# Patient Record
Sex: MALE | Race: WHITE | NOT HISPANIC OR LATINO | Employment: FULL TIME | URBAN - METROPOLITAN AREA
[De-identification: names, ages, dates, MRNs, and addresses within clinical notes are randomized per-mention and may not be internally consistent; named-entity substitution may affect disease eponyms.]

---

## 2019-06-23 ENCOUNTER — HOSPITAL ENCOUNTER (EMERGENCY)
Facility: MEDICAL CENTER | Age: 24
End: 2019-06-23
Attending: EMERGENCY MEDICINE
Payer: COMMERCIAL

## 2019-06-23 ENCOUNTER — APPOINTMENT (OUTPATIENT)
Dept: RADIOLOGY | Facility: MEDICAL CENTER | Age: 24
End: 2019-06-23
Attending: EMERGENCY MEDICINE
Payer: COMMERCIAL

## 2019-06-23 VITALS
OXYGEN SATURATION: 96 % | SYSTOLIC BLOOD PRESSURE: 113 MMHG | DIASTOLIC BLOOD PRESSURE: 60 MMHG | WEIGHT: 130 LBS | BODY MASS INDEX: 20.4 KG/M2 | HEIGHT: 67 IN | TEMPERATURE: 99.3 F | RESPIRATION RATE: 16 BRPM | HEART RATE: 59 BPM

## 2019-06-23 DIAGNOSIS — S93.402A SPRAIN OF LEFT ANKLE, UNSPECIFIED LIGAMENT, INITIAL ENCOUNTER: ICD-10-CM

## 2019-06-23 PROCEDURE — 99284 EMERGENCY DEPT VISIT MOD MDM: CPT

## 2019-06-23 PROCEDURE — 73610 X-RAY EXAM OF ANKLE: CPT | Mod: LT

## 2019-06-23 ASSESSMENT — ENCOUNTER SYMPTOMS: SENSORY CHANGE: 0

## 2019-06-23 NOTE — DISCHARGE INSTRUCTIONS
Keep your ankle in the splint.  Follow-up with orthopedics.  Return for pain swelling numbness, or other concerns.
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
## 2019-06-23 NOTE — ED NOTES
Pt discharged to home. Pt was given follow up instructions. Pt verbalized understanding of all instructions for discharge and is ambulatory out of ED on crutches with steady gait noted, friends driving pt home.
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
## 2020-09-12 ENCOUNTER — APPOINTMENT (OUTPATIENT)
Dept: RADIOLOGY | Facility: MEDICAL CENTER | Age: 25
DRG: 493 | End: 2020-09-12
Attending: ORTHOPAEDIC SURGERY
Payer: COMMERCIAL

## 2020-09-12 ENCOUNTER — ANESTHESIA EVENT (OUTPATIENT)
Dept: SURGERY | Facility: MEDICAL CENTER | Age: 25
DRG: 493 | End: 2020-09-12
Payer: COMMERCIAL

## 2020-09-12 ENCOUNTER — APPOINTMENT (OUTPATIENT)
Dept: RADIOLOGY | Facility: MEDICAL CENTER | Age: 25
DRG: 493 | End: 2020-09-12
Attending: EMERGENCY MEDICINE
Payer: COMMERCIAL

## 2020-09-12 ENCOUNTER — ANESTHESIA (OUTPATIENT)
Dept: SURGERY | Facility: MEDICAL CENTER | Age: 25
DRG: 493 | End: 2020-09-12
Payer: COMMERCIAL

## 2020-09-12 ENCOUNTER — HOSPITAL ENCOUNTER (INPATIENT)
Facility: MEDICAL CENTER | Age: 25
LOS: 1 days | DRG: 493 | End: 2020-09-13
Attending: EMERGENCY MEDICINE | Admitting: SURGERY
Payer: COMMERCIAL

## 2020-09-12 DIAGNOSIS — L08.9 INFECTED ABRASION OF RIGHT WRIST, INITIAL ENCOUNTER: ICD-10-CM

## 2020-09-12 DIAGNOSIS — S09.90XA CLOSED HEAD INJURY, INITIAL ENCOUNTER: ICD-10-CM

## 2020-09-12 DIAGNOSIS — S00.512A: ICD-10-CM

## 2020-09-12 DIAGNOSIS — S72.491A OTHER CLOSED FRACTURE OF DISTAL END OF RIGHT FEMUR, INITIAL ENCOUNTER (HCC): ICD-10-CM

## 2020-09-12 DIAGNOSIS — S72.401A CLOSED FRACTURE OF DISTAL END OF RIGHT FEMUR, INITIAL ENCOUNTER (HCC): ICD-10-CM

## 2020-09-12 DIAGNOSIS — S82.101A CLOSED FRACTURE OF PROXIMAL END OF RIGHT TIBIA, INITIAL ENCOUNTER: ICD-10-CM

## 2020-09-12 DIAGNOSIS — S02.401A CLOSED FRACTURE OF MAXILLARY SINUS, INITIAL ENCOUNTER (HCC): ICD-10-CM

## 2020-09-12 DIAGNOSIS — S60.811A INFECTED ABRASION OF RIGHT WRIST, INITIAL ENCOUNTER: ICD-10-CM

## 2020-09-12 PROBLEM — S72.402A CLOSED FRACTURE OF LEFT DISTAL FEMUR (HCC): Status: ACTIVE | Noted: 2020-09-12

## 2020-09-12 PROBLEM — Z90.5 ABSENCE OF KIDNEY: Status: ACTIVE | Noted: 2020-09-12

## 2020-09-12 PROBLEM — Z53.09 CONTRAINDICATION TO DEEP VEIN THROMBOSIS (DVT) PROPHYLAXIS: Status: ACTIVE | Noted: 2020-09-12

## 2020-09-12 PROBLEM — S06.0X9A CONCUSSION WITH LOSS OF CONSCIOUSNESS: Status: ACTIVE | Noted: 2020-09-12

## 2020-09-12 PROBLEM — Z11.9 SCREENING EXAMINATION FOR INFECTIOUS DISEASE: Status: ACTIVE | Noted: 2020-09-12

## 2020-09-12 PROBLEM — S02.40CA CLOSED FRACTURE OF RIGHT SIDE OF MAXILLA (HCC): Status: ACTIVE | Noted: 2020-09-12

## 2020-09-12 PROBLEM — S82.102A CLOSED FRACTURE OF LEFT PROXIMAL TIBIA: Status: ACTIVE | Noted: 2020-09-12

## 2020-09-12 LAB
ABO GROUP BLD: NORMAL
ALBUMIN SERPL BCP-MCNC: 4.7 G/DL (ref 3.2–4.9)
ALBUMIN/GLOB SERPL: 1.8 G/DL
ALP SERPL-CCNC: 71 U/L (ref 30–99)
ALT SERPL-CCNC: 90 U/L (ref 2–50)
ANION GAP SERPL CALC-SCNC: 14 MMOL/L (ref 7–16)
APTT PPP: 23.7 SEC (ref 24.7–36)
AST SERPL-CCNC: 76 U/L (ref 12–45)
BILIRUB SERPL-MCNC: 0.8 MG/DL (ref 0.1–1.5)
BLD GP AB SCN SERPL QL: NORMAL
BUN SERPL-MCNC: 11 MG/DL (ref 8–22)
CALCIUM SERPL-MCNC: 9.3 MG/DL (ref 8.5–10.5)
CHLORIDE SERPL-SCNC: 104 MMOL/L (ref 96–112)
CO2 SERPL-SCNC: 21 MMOL/L (ref 20–33)
COVID ORDER STATUS COVID19: NORMAL
CREAT SERPL-MCNC: 1 MG/DL (ref 0.5–1.4)
ERYTHROCYTE [DISTWIDTH] IN BLOOD BY AUTOMATED COUNT: 39.7 FL (ref 35.9–50)
ETHANOL BLD-MCNC: <10.1 MG/DL (ref 0–10.1)
GLOBULIN SER CALC-MCNC: 2.6 G/DL (ref 1.9–3.5)
GLUCOSE SERPL-MCNC: 154 MG/DL (ref 65–99)
HCT VFR BLD AUTO: 48.4 % (ref 42–52)
HGB BLD-MCNC: 16.9 G/DL (ref 14–18)
INR PPP: 0.98 (ref 0.87–1.13)
MCH RBC QN AUTO: 31.2 PG (ref 27–33)
MCHC RBC AUTO-ENTMCNC: 34.9 G/DL (ref 33.7–35.3)
MCV RBC AUTO: 89.5 FL (ref 81.4–97.8)
PLATELET # BLD AUTO: 300 K/UL (ref 164–446)
PMV BLD AUTO: 11.3 FL (ref 9–12.9)
POTASSIUM SERPL-SCNC: 3.4 MMOL/L (ref 3.6–5.5)
PROT SERPL-MCNC: 7.3 G/DL (ref 6–8.2)
PROTHROMBIN TIME: 13.3 SEC (ref 12–14.6)
RBC # BLD AUTO: 5.41 M/UL (ref 4.7–6.1)
RH BLD: NORMAL
SARS-COV-2 RDRP RESP QL NAA+PROBE: NOTDETECTED
SODIUM SERPL-SCNC: 139 MMOL/L (ref 135–145)
SPECIMEN SOURCE: NORMAL
WBC # BLD AUTO: 10.3 K/UL (ref 4.8–10.8)

## 2020-09-12 PROCEDURE — 700117 HCHG RX CONTRAST REV CODE 255: Performed by: EMERGENCY MEDICINE

## 2020-09-12 PROCEDURE — 160041 HCHG SURGERY MINUTES - EA ADDL 1 MIN LEVEL 4: Performed by: ORTHOPAEDIC SURGERY

## 2020-09-12 PROCEDURE — 85730 THROMBOPLASTIN TIME PARTIAL: CPT

## 2020-09-12 PROCEDURE — A6454 SELF-ADHER BAND W>=3" <5"/YD: HCPCS | Performed by: ORTHOPAEDIC SURGERY

## 2020-09-12 PROCEDURE — 700111 HCHG RX REV CODE 636 W/ 250 OVERRIDE (IP): Performed by: EMERGENCY MEDICINE

## 2020-09-12 PROCEDURE — 73552 X-RAY EXAM OF FEMUR 2/>: CPT | Mod: RT

## 2020-09-12 PROCEDURE — 74177 CT ABD & PELVIS W/CONTRAST: CPT

## 2020-09-12 PROCEDURE — 0QSG06Z REPOSITION RIGHT TIBIA WITH INTRAMEDULLARY INTERNAL FIXATION DEVICE, OPEN APPROACH: ICD-10-PCS | Performed by: ORTHOPAEDIC SURGERY

## 2020-09-12 PROCEDURE — 160029 HCHG SURGERY MINUTES - 1ST 30 MINS LEVEL 4: Performed by: ORTHOPAEDIC SURGERY

## 2020-09-12 PROCEDURE — 160048 HCHG OR STATISTICAL LEVEL 1-5: Performed by: ORTHOPAEDIC SURGERY

## 2020-09-12 PROCEDURE — 700101 HCHG RX REV CODE 250: Performed by: NURSE PRACTITIONER

## 2020-09-12 PROCEDURE — 80307 DRUG TEST PRSMV CHEM ANLYZR: CPT

## 2020-09-12 PROCEDURE — 90715 TDAP VACCINE 7 YRS/> IM: CPT | Performed by: EMERGENCY MEDICINE

## 2020-09-12 PROCEDURE — 160036 HCHG PACU - EA ADDL 30 MINS PHASE I: Performed by: ORTHOPAEDIC SURGERY

## 2020-09-12 PROCEDURE — 770006 HCHG ROOM/CARE - MED/SURG/GYN SEMI*

## 2020-09-12 PROCEDURE — U0004 COV-19 TEST NON-CDC HGH THRU: HCPCS

## 2020-09-12 PROCEDURE — 72170 X-RAY EXAM OF PELVIS: CPT

## 2020-09-12 PROCEDURE — 70486 CT MAXILLOFACIAL W/O DYE: CPT

## 2020-09-12 PROCEDURE — 96365 THER/PROPH/DIAG IV INF INIT: CPT

## 2020-09-12 PROCEDURE — 36415 COLL VENOUS BLD VENIPUNCTURE: CPT

## 2020-09-12 PROCEDURE — 72128 CT CHEST SPINE W/O DYE: CPT

## 2020-09-12 PROCEDURE — 86900 BLOOD TYPING SEROLOGIC ABO: CPT

## 2020-09-12 PROCEDURE — 73700 CT LOWER EXTREMITY W/O DYE: CPT | Mod: RT

## 2020-09-12 PROCEDURE — 502240 HCHG MISC OR SUPPLY RC 0272: Performed by: ORTHOPAEDIC SURGERY

## 2020-09-12 PROCEDURE — 700111 HCHG RX REV CODE 636 W/ 250 OVERRIDE (IP): Performed by: ANESTHESIOLOGY

## 2020-09-12 PROCEDURE — 160002 HCHG RECOVERY MINUTES (STAT): Performed by: ORTHOPAEDIC SURGERY

## 2020-09-12 PROCEDURE — 500891 HCHG PACK, ORTHO MAJOR: Performed by: ORTHOPAEDIC SURGERY

## 2020-09-12 PROCEDURE — 700101 HCHG RX REV CODE 250: Performed by: ANESTHESIOLOGY

## 2020-09-12 PROCEDURE — 72131 CT LUMBAR SPINE W/O DYE: CPT

## 2020-09-12 PROCEDURE — 700111 HCHG RX REV CODE 636 W/ 250 OVERRIDE (IP)

## 2020-09-12 PROCEDURE — 86901 BLOOD TYPING SEROLOGIC RH(D): CPT

## 2020-09-12 PROCEDURE — 501445 HCHG STAPLER, SKIN DISP: Performed by: ORTHOPAEDIC SURGERY

## 2020-09-12 PROCEDURE — G0390 TRAUMA RESPONS W/HOSP CRITI: HCPCS

## 2020-09-12 PROCEDURE — 502000 HCHG MISC OR IMPLANTS RC 0278: Performed by: ORTHOPAEDIC SURGERY

## 2020-09-12 PROCEDURE — 72125 CT NECK SPINE W/O DYE: CPT

## 2020-09-12 PROCEDURE — 71045 X-RAY EXAM CHEST 1 VIEW: CPT

## 2020-09-12 PROCEDURE — 80053 COMPREHEN METABOLIC PANEL: CPT

## 2020-09-12 PROCEDURE — 99291 CRITICAL CARE FIRST HOUR: CPT

## 2020-09-12 PROCEDURE — 70450 CT HEAD/BRAIN W/O DYE: CPT

## 2020-09-12 PROCEDURE — 85610 PROTHROMBIN TIME: CPT

## 2020-09-12 PROCEDURE — 160035 HCHG PACU - 1ST 60 MINS PHASE I: Performed by: ORTHOPAEDIC SURGERY

## 2020-09-12 PROCEDURE — 700105 HCHG RX REV CODE 258

## 2020-09-12 PROCEDURE — C1713 ANCHOR/SCREW BN/BN,TIS/BN: HCPCS | Performed by: ORTHOPAEDIC SURGERY

## 2020-09-12 PROCEDURE — 501838 HCHG SUTURE GENERAL: Performed by: ORTHOPAEDIC SURGERY

## 2020-09-12 PROCEDURE — C9803 HOPD COVID-19 SPEC COLLECT: HCPCS | Performed by: EMERGENCY MEDICINE

## 2020-09-12 PROCEDURE — 160009 HCHG ANES TIME/MIN: Performed by: ORTHOPAEDIC SURGERY

## 2020-09-12 PROCEDURE — 86850 RBC ANTIBODY SCREEN: CPT

## 2020-09-12 PROCEDURE — 500054 HCHG BANDAGE, ELASTIC 6: Performed by: ORTHOPAEDIC SURGERY

## 2020-09-12 PROCEDURE — A9270 NON-COVERED ITEM OR SERVICE: HCPCS | Performed by: NURSE PRACTITIONER

## 2020-09-12 PROCEDURE — 90471 IMMUNIZATION ADMIN: CPT

## 2020-09-12 PROCEDURE — 700102 HCHG RX REV CODE 250 W/ 637 OVERRIDE(OP): Performed by: NURSE PRACTITIONER

## 2020-09-12 PROCEDURE — 700105 HCHG RX REV CODE 258: Performed by: ANESTHESIOLOGY

## 2020-09-12 PROCEDURE — 3E0234Z INTRODUCTION OF SERUM, TOXOID AND VACCINE INTO MUSCLE, PERCUTANEOUS APPROACH: ICD-10-PCS | Performed by: SURGERY

## 2020-09-12 PROCEDURE — 96376 TX/PRO/DX INJ SAME DRUG ADON: CPT

## 2020-09-12 PROCEDURE — 85027 COMPLETE CBC AUTOMATED: CPT

## 2020-09-12 PROCEDURE — 700111 HCHG RX REV CODE 636 W/ 250 OVERRIDE (IP): Performed by: ORTHOPAEDIC SURGERY

## 2020-09-12 PROCEDURE — 96375 TX/PRO/DX INJ NEW DRUG ADDON: CPT

## 2020-09-12 PROCEDURE — 73590 X-RAY EXAM OF LOWER LEG: CPT | Mod: RT

## 2020-09-12 DEVICE — IMPLANTABLE DEVICE: Type: IMPLANTABLE DEVICE | Site: FEMUR | Status: FUNCTIONAL

## 2020-09-12 DEVICE — SCREW FOR IM NAILS TI LOCKING WITH T25 STARDRIVE 5.0MM 34MM (2TX2=4): Type: IMPLANTABLE DEVICE | Site: FEMUR | Status: FUNCTIONAL

## 2020-09-12 RX ORDER — ONDANSETRON 2 MG/ML
INJECTION INTRAMUSCULAR; INTRAVENOUS PRN
Status: DISCONTINUED | OUTPATIENT
Start: 2020-09-12 | End: 2020-09-12 | Stop reason: SURG

## 2020-09-12 RX ORDER — ACETAMINOPHEN 500 MG
1000 TABLET ORAL EVERY 6 HOURS
Status: DISCONTINUED | OUTPATIENT
Start: 2020-09-12 | End: 2020-09-13 | Stop reason: HOSPADM

## 2020-09-12 RX ORDER — ONDANSETRON 2 MG/ML
4 INJECTION INTRAMUSCULAR; INTRAVENOUS EVERY 4 HOURS PRN
Status: DISCONTINUED | OUTPATIENT
Start: 2020-09-12 | End: 2020-09-13 | Stop reason: HOSPADM

## 2020-09-12 RX ORDER — CEFAZOLIN SODIUM 1 G/3ML
INJECTION, POWDER, FOR SOLUTION INTRAMUSCULAR; INTRAVENOUS PRN
Status: DISCONTINUED | OUTPATIENT
Start: 2020-09-12 | End: 2020-09-12 | Stop reason: SURG

## 2020-09-12 RX ORDER — DIPHENHYDRAMINE HYDROCHLORIDE 50 MG/ML
12.5 INJECTION INTRAMUSCULAR; INTRAVENOUS
Status: DISCONTINUED | OUTPATIENT
Start: 2020-09-12 | End: 2020-09-12 | Stop reason: HOSPADM

## 2020-09-12 RX ORDER — AMOXICILLIN 250 MG
1 CAPSULE ORAL
Status: DISCONTINUED | OUTPATIENT
Start: 2020-09-12 | End: 2020-09-13 | Stop reason: HOSPADM

## 2020-09-12 RX ORDER — CEFAZOLIN SODIUM 2 G/100ML
INJECTION, SOLUTION INTRAVENOUS
Status: COMPLETED | OUTPATIENT
Start: 2020-09-12 | End: 2020-09-12

## 2020-09-12 RX ORDER — HYDROMORPHONE HYDROCHLORIDE 1 MG/ML
0.2 INJECTION, SOLUTION INTRAMUSCULAR; INTRAVENOUS; SUBCUTANEOUS
Status: DISCONTINUED | OUTPATIENT
Start: 2020-09-12 | End: 2020-09-12 | Stop reason: HOSPADM

## 2020-09-12 RX ORDER — MORPHINE SULFATE 4 MG/ML
4 INJECTION, SOLUTION INTRAMUSCULAR; INTRAVENOUS
Status: DISCONTINUED | OUTPATIENT
Start: 2020-09-12 | End: 2020-09-13 | Stop reason: HOSPADM

## 2020-09-12 RX ORDER — MIDAZOLAM HYDROCHLORIDE 1 MG/ML
1 INJECTION INTRAMUSCULAR; INTRAVENOUS
Status: DISCONTINUED | OUTPATIENT
Start: 2020-09-12 | End: 2020-09-12 | Stop reason: HOSPADM

## 2020-09-12 RX ORDER — DOCUSATE SODIUM 100 MG/1
100 CAPSULE, LIQUID FILLED ORAL 2 TIMES DAILY
Status: DISCONTINUED | OUTPATIENT
Start: 2020-09-12 | End: 2020-09-13 | Stop reason: HOSPADM

## 2020-09-12 RX ORDER — ONDANSETRON 2 MG/ML
4 INJECTION INTRAMUSCULAR; INTRAVENOUS ONCE
Status: COMPLETED | OUTPATIENT
Start: 2020-09-12 | End: 2020-09-12

## 2020-09-12 RX ORDER — CEFAZOLIN SODIUM 2 G/100ML
2 INJECTION, SOLUTION INTRAVENOUS EVERY 8 HOURS
Status: COMPLETED | OUTPATIENT
Start: 2020-09-12 | End: 2020-09-13

## 2020-09-12 RX ORDER — ONDANSETRON 2 MG/ML
4 INJECTION INTRAMUSCULAR; INTRAVENOUS
Status: DISCONTINUED | OUTPATIENT
Start: 2020-09-12 | End: 2020-09-12 | Stop reason: HOSPADM

## 2020-09-12 RX ORDER — OXYCODONE HCL 5 MG/5 ML
5 SOLUTION, ORAL ORAL
Status: DISCONTINUED | OUTPATIENT
Start: 2020-09-12 | End: 2020-09-12 | Stop reason: HOSPADM

## 2020-09-12 RX ORDER — AMOXICILLIN 250 MG
1 CAPSULE ORAL NIGHTLY
Status: DISCONTINUED | OUTPATIENT
Start: 2020-09-12 | End: 2020-09-13 | Stop reason: HOSPADM

## 2020-09-12 RX ORDER — SODIUM CHLORIDE, SODIUM LACTATE, POTASSIUM CHLORIDE, CALCIUM CHLORIDE 600; 310; 30; 20 MG/100ML; MG/100ML; MG/100ML; MG/100ML
INJECTION, SOLUTION INTRAVENOUS
Status: DISCONTINUED | OUTPATIENT
Start: 2020-09-12 | End: 2020-09-12 | Stop reason: SURG

## 2020-09-12 RX ORDER — SODIUM CHLORIDE 9 MG/ML
INJECTION, SOLUTION INTRAVENOUS
Status: COMPLETED
Start: 2020-09-12 | End: 2020-09-12

## 2020-09-12 RX ORDER — ROCURONIUM BROMIDE 10 MG/ML
INJECTION, SOLUTION INTRAVENOUS PRN
Status: DISCONTINUED | OUTPATIENT
Start: 2020-09-12 | End: 2020-09-12 | Stop reason: SURG

## 2020-09-12 RX ORDER — BACITRACIN ZINC AND POLYMYXIN B SULFATE 500; 1000 [USP'U]/G; [USP'U]/G
OINTMENT TOPICAL 3 TIMES DAILY
Status: DISCONTINUED | OUTPATIENT
Start: 2020-09-12 | End: 2020-09-13 | Stop reason: HOSPADM

## 2020-09-12 RX ORDER — POLYETHYLENE GLYCOL 3350 17 G/17G
1 POWDER, FOR SOLUTION ORAL 2 TIMES DAILY
Status: DISCONTINUED | OUTPATIENT
Start: 2020-09-12 | End: 2020-09-13 | Stop reason: HOSPADM

## 2020-09-12 RX ORDER — MEPERIDINE HYDROCHLORIDE 25 MG/ML
12.5 INJECTION INTRAMUSCULAR; INTRAVENOUS; SUBCUTANEOUS
Status: DISCONTINUED | OUTPATIENT
Start: 2020-09-12 | End: 2020-09-12 | Stop reason: HOSPADM

## 2020-09-12 RX ORDER — MORPHINE SULFATE 10 MG/ML
5 INJECTION, SOLUTION INTRAMUSCULAR; INTRAVENOUS ONCE
Status: DISCONTINUED | OUTPATIENT
Start: 2020-09-12 | End: 2020-09-13

## 2020-09-12 RX ORDER — ENEMA 19; 7 G/133ML; G/133ML
1 ENEMA RECTAL
Status: DISCONTINUED | OUTPATIENT
Start: 2020-09-12 | End: 2020-09-13 | Stop reason: HOSPADM

## 2020-09-12 RX ORDER — OXYCODONE HYDROCHLORIDE 5 MG/1
5 TABLET ORAL
Status: DISCONTINUED | OUTPATIENT
Start: 2020-09-12 | End: 2020-09-13 | Stop reason: HOSPADM

## 2020-09-12 RX ORDER — LIDOCAINE HYDROCHLORIDE 20 MG/ML
INJECTION, SOLUTION EPIDURAL; INFILTRATION; INTRACAUDAL; PERINEURAL PRN
Status: DISCONTINUED | OUTPATIENT
Start: 2020-09-12 | End: 2020-09-12 | Stop reason: SURG

## 2020-09-12 RX ORDER — DEXAMETHASONE SODIUM PHOSPHATE 4 MG/ML
INJECTION, SOLUTION INTRA-ARTICULAR; INTRALESIONAL; INTRAMUSCULAR; INTRAVENOUS; SOFT TISSUE PRN
Status: DISCONTINUED | OUTPATIENT
Start: 2020-09-12 | End: 2020-09-12 | Stop reason: SURG

## 2020-09-12 RX ORDER — MEPERIDINE HYDROCHLORIDE 25 MG/ML
INJECTION INTRAMUSCULAR; INTRAVENOUS; SUBCUTANEOUS
Status: COMPLETED
Start: 2020-09-12 | End: 2020-09-12

## 2020-09-12 RX ORDER — HYDROMORPHONE HYDROCHLORIDE 1 MG/ML
0.4 INJECTION, SOLUTION INTRAMUSCULAR; INTRAVENOUS; SUBCUTANEOUS
Status: DISCONTINUED | OUTPATIENT
Start: 2020-09-12 | End: 2020-09-12 | Stop reason: HOSPADM

## 2020-09-12 RX ORDER — OXYCODONE HCL 5 MG/5 ML
10 SOLUTION, ORAL ORAL
Status: DISCONTINUED | OUTPATIENT
Start: 2020-09-12 | End: 2020-09-12 | Stop reason: HOSPADM

## 2020-09-12 RX ORDER — OXYCODONE HYDROCHLORIDE 5 MG/1
10 TABLET ORAL
Status: DISCONTINUED | OUTPATIENT
Start: 2020-09-12 | End: 2020-09-13 | Stop reason: HOSPADM

## 2020-09-12 RX ORDER — MORPHINE SULFATE 4 MG/ML
INJECTION, SOLUTION INTRAMUSCULAR; INTRAVENOUS
Status: COMPLETED | OUTPATIENT
Start: 2020-09-12 | End: 2020-09-12

## 2020-09-12 RX ORDER — SODIUM CHLORIDE, SODIUM LACTATE, POTASSIUM CHLORIDE, CALCIUM CHLORIDE 600; 310; 30; 20 MG/100ML; MG/100ML; MG/100ML; MG/100ML
INJECTION, SOLUTION INTRAVENOUS CONTINUOUS
Status: DISCONTINUED | OUTPATIENT
Start: 2020-09-12 | End: 2020-09-12 | Stop reason: HOSPADM

## 2020-09-12 RX ORDER — HYDROMORPHONE HYDROCHLORIDE 1 MG/ML
0.1 INJECTION, SOLUTION INTRAMUSCULAR; INTRAVENOUS; SUBCUTANEOUS
Status: DISCONTINUED | OUTPATIENT
Start: 2020-09-12 | End: 2020-09-12 | Stop reason: HOSPADM

## 2020-09-12 RX ORDER — HALOPERIDOL 5 MG/ML
1 INJECTION INTRAMUSCULAR
Status: DISCONTINUED | OUTPATIENT
Start: 2020-09-12 | End: 2020-09-12 | Stop reason: HOSPADM

## 2020-09-12 RX ORDER — BISACODYL 10 MG
10 SUPPOSITORY, RECTAL RECTAL
Status: DISCONTINUED | OUTPATIENT
Start: 2020-09-12 | End: 2020-09-13 | Stop reason: HOSPADM

## 2020-09-12 RX ORDER — ONDANSETRON 2 MG/ML
INJECTION INTRAMUSCULAR; INTRAVENOUS
Status: COMPLETED | OUTPATIENT
Start: 2020-09-12 | End: 2020-09-12

## 2020-09-12 RX ADMIN — ACETAMINOPHEN 1000 MG: 500 TABLET ORAL at 23:54

## 2020-09-12 RX ADMIN — OXYCODONE 5 MG: 5 TABLET ORAL at 23:55

## 2020-09-12 RX ADMIN — ROCURONIUM BROMIDE 50 MG: 10 INJECTION, SOLUTION INTRAVENOUS at 15:03

## 2020-09-12 RX ADMIN — ONDANSETRON 4 MG: 2 INJECTION INTRAMUSCULAR; INTRAVENOUS at 16:17

## 2020-09-12 RX ADMIN — PROPOFOL 170 MG: 10 INJECTION, EMULSION INTRAVENOUS at 15:03

## 2020-09-12 RX ADMIN — MORPHINE SULFATE 4 MG: 4 INJECTION INTRAVENOUS at 11:11

## 2020-09-12 RX ADMIN — CEFAZOLIN 2 G: 330 INJECTION, POWDER, FOR SOLUTION INTRAMUSCULAR; INTRAVENOUS at 15:07

## 2020-09-12 RX ADMIN — MEPERIDINE HYDROCHLORIDE 12.5 MG: 25 INJECTION INTRAMUSCULAR; INTRAVENOUS; SUBCUTANEOUS at 16:50

## 2020-09-12 RX ADMIN — IOHEXOL 80 ML: 350 INJECTION, SOLUTION INTRAVENOUS at 11:54

## 2020-09-12 RX ADMIN — LIDOCAINE HYDROCHLORIDE 50 MG: 20 INJECTION, SOLUTION EPIDURAL; INFILTRATION; INTRACAUDAL at 15:03

## 2020-09-12 RX ADMIN — FENTANYL CITRATE 100 MCG: 50 INJECTION INTRAMUSCULAR; INTRAVENOUS at 15:00

## 2020-09-12 RX ADMIN — Medication 1 EACH: at 21:27

## 2020-09-12 RX ADMIN — CLOSTRIDIUM TETANI TOXOID ANTIGEN (FORMALDEHYDE INACTIVATED), CORYNEBACTERIUM DIPHTHERIAE TOXOID ANTIGEN (FORMALDEHYDE INACTIVATED), BORDETELLA PERTUSSIS TOXOID ANTIGEN (GLUTARALDEHYDE INACTIVATED), BORDETELLA PERTUSSIS FILAMENTOUS HEMAGGLUTININ ANTIGEN (FORMALDEHYDE INACTIVATED), BORDETELLA PERTUSSIS PERTACTIN ANTIGEN, AND BORDETELLA PERTUSSIS FIMBRIAE 2/3 ANTIGEN 0.5 ML: 5; 2; 2.5; 5; 3; 5 INJECTION, SUSPENSION INTRAMUSCULAR at 11:23

## 2020-09-12 RX ADMIN — SUGAMMADEX 200 MG: 100 INJECTION, SOLUTION INTRAVENOUS at 16:23

## 2020-09-12 RX ADMIN — SODIUM CHLORIDE, POTASSIUM CHLORIDE, SODIUM LACTATE AND CALCIUM CHLORIDE: 600; 310; 30; 20 INJECTION, SOLUTION INTRAVENOUS at 14:58

## 2020-09-12 RX ADMIN — OXYCODONE 5 MG: 5 TABLET ORAL at 18:57

## 2020-09-12 RX ADMIN — DOCUSATE SODIUM 100 MG: 100 CAPSULE ORAL at 18:48

## 2020-09-12 RX ADMIN — CEFAZOLIN SODIUM 2 G: 2 INJECTION, SOLUTION INTRAVENOUS at 22:15

## 2020-09-12 RX ADMIN — DEXAMETHASONE SODIUM PHOSPHATE 8 MG: 4 INJECTION, SOLUTION INTRA-ARTICULAR; INTRALESIONAL; INTRAMUSCULAR; INTRAVENOUS; SOFT TISSUE at 15:14

## 2020-09-12 RX ADMIN — SODIUM CHLORIDE 500 ML: 9 INJECTION, SOLUTION INTRAVENOUS at 23:57

## 2020-09-12 RX ADMIN — ONDANSETRON 4 MG: 2 INJECTION INTRAMUSCULAR; INTRAVENOUS at 11:11

## 2020-09-12 RX ADMIN — HYDROMORPHONE HYDROCHLORIDE 0.4 MG: 1 INJECTION, SOLUTION INTRAMUSCULAR; INTRAVENOUS; SUBCUTANEOUS at 17:13

## 2020-09-12 RX ADMIN — DOCUSATE SODIUM 50 MG AND SENNOSIDES 8.6 MG 1 TABLET: 8.6; 5 TABLET, FILM COATED ORAL at 21:27

## 2020-09-12 RX ADMIN — ONDANSETRON 4 MG: 2 INJECTION INTRAMUSCULAR; INTRAVENOUS at 12:08

## 2020-09-12 RX ADMIN — CEFAZOLIN SODIUM 2 G: 2 INJECTION, SOLUTION INTRAVENOUS at 11:16

## 2020-09-12 RX ADMIN — ACETAMINOPHEN 1000 MG: 500 TABLET ORAL at 18:47

## 2020-09-12 SDOH — HEALTH STABILITY: MENTAL HEALTH: HOW OFTEN DO YOU HAVE A DRINK CONTAINING ALCOHOL?: NEVER

## 2020-09-12 ASSESSMENT — LIFESTYLE VARIABLES
TOTAL SCORE: 0
HAVE PEOPLE ANNOYED YOU BY CRITICIZING YOUR DRINKING: NO
HAVE YOU EVER FELT YOU SHOULD CUT DOWN ON YOUR DRINKING: NO
AVERAGE NUMBER OF DAYS PER WEEK YOU HAVE A DRINK CONTAINING ALCOHOL: 2
EVER HAD A DRINK FIRST THING IN THE MORNING TO STEADY YOUR NERVES TO GET RID OF A HANGOVER: NO
ALCOHOL_USE: NO
EVER FELT BAD OR GUILTY ABOUT YOUR DRINKING: NO
TOTAL SCORE: 0
TOTAL SCORE: 0
CONSUMPTION TOTAL: NEGATIVE
HOW MANY TIMES IN THE PAST YEAR HAVE YOU HAD 5 OR MORE DRINKS IN A DAY: 0
ON A TYPICAL DAY WHEN YOU DRINK ALCOHOL HOW MANY DRINKS DO YOU HAVE: 3

## 2020-09-12 ASSESSMENT — PAIN DESCRIPTION - PAIN TYPE
TYPE: SURGICAL PAIN

## 2020-09-12 ASSESSMENT — COGNITIVE AND FUNCTIONAL STATUS - GENERAL
DAILY ACTIVITIY SCORE: 18
MOVING TO AND FROM BED TO CHAIR: A LOT
MOBILITY SCORE: 12
MOVING FROM LYING ON BACK TO SITTING ON SIDE OF FLAT BED: A LOT
WALKING IN HOSPITAL ROOM: A LOT
STANDING UP FROM CHAIR USING ARMS: A LOT
TOILETING: A LITTLE
DRESSING REGULAR LOWER BODY CLOTHING: A LITTLE
HELP NEEDED FOR BATHING: A LITTLE
EATING MEALS: A LITTLE
SUGGESTED CMS G CODE MODIFIER DAILY ACTIVITY: CK
PERSONAL GROOMING: A LITTLE
SUGGESTED CMS G CODE MODIFIER MOBILITY: CL
CLIMB 3 TO 5 STEPS WITH RAILING: A LOT
DRESSING REGULAR UPPER BODY CLOTHING: A LITTLE
TURNING FROM BACK TO SIDE WHILE IN FLAT BAD: A LOT

## 2020-09-12 ASSESSMENT — PATIENT HEALTH QUESTIONNAIRE - PHQ9
2. FEELING DOWN, DEPRESSED, IRRITABLE, OR HOPELESS: NOT AT ALL
SUM OF ALL RESPONSES TO PHQ9 QUESTIONS 1 AND 2: 0
1. LITTLE INTEREST OR PLEASURE IN DOING THINGS: NOT AT ALL

## 2020-09-12 ASSESSMENT — PAIN SCALES - GENERAL: PAIN_LEVEL: 4

## 2020-09-12 ASSESSMENT — FIBROSIS 4 INDEX: FIB4 SCORE: 0.64

## 2020-09-12 NOTE — ASSESSMENT & PLAN NOTE
Displaced angulated distal femoral diaphysis fracture.  9/13 ORIF  Weight bearing status - Touch toe weightbearing RLE.  Lele Corbett MD. Orthopedic Surgeon. [facility].
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
## 2020-09-12 NOTE — ANESTHESIA PREPROCEDURE EVALUATION
Relevant Problems   No relevant active problems       Physical Exam    Airway   Mallampati: II  TM distance: >3 FB  Neck ROM: full       Cardiovascular - normal exam  Rhythm: regular  Rate: normal  (-) murmur     Dental - normal exam           Pulmonary - normal exam  Breath sounds clear to auscultation     Abdominal    Neurological - normal exam                 Anesthesia Plan    ASA 2- EMERGENT   ASA physical status emergent criteria: displaced fracture with possible neurovascular compromise    Plan - general       Airway plan will be ETT                  Informed Consent:
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
## 2020-09-12 NOTE — PROGRESS NOTES
ORTHO RESPONSE TIME:  I was contacted by Waqas Iqbal PA-C at 11:14am requesting a formal orthopaedic consultation.  I responded to the consultation request at 11:24am.  I physically evaluated the patient at 11:28am in CT.    Lele Corbett M.D.

## 2020-09-12 NOTE — OR SURGEON
Immediate Post OP Note    PreOp Diagnosis: Right distal third femur shaft fracture    PostOp Diagnosis: same    Procedure(s):  INSERTION, INTRAMEDULLARY CRISTIAN, FEMUR - Wound Class: Clean    Surgeon(s):  Lele Corbett M.D.    Anesthesiologist/Type of Anesthesia:  Anesthesiologist: Dimas Zimmer M.D./General    Surgical Staff:  Circulator: Lukas D. Gansert, R.N.  Scrub Person: Anita A Reyes, R.N.; Jose Ramon Pan  First Assist: Waqas Iqbal P.A.-C.  Radiology Technologist: Luc Weiss; Sheila Reyes    Specimens removed if any:  * No specimens in log *    Estimated Blood Loss: minimal    Findings: see dictation    Complications: none known    PLAN:  --readmit trauma postop  --TTWB RLE with crutches  --ancef x 2 doses postop  --PT/OT for mobilization ASAP  --okay to start lovenox or equivalent in am, continue SCDs        9/12/2020 4:28 PM Lele Corbett M.D.

## 2020-09-12 NOTE — ED NOTES
Pt friend Sabino at bedside. Pt adimante that this is the only visitor he would like to be brought to bedside at this time. Explained current visitor policy to pt, pt verbalized understanding.

## 2020-09-12 NOTE — ASSESSMENT & PLAN NOTE
Likely minimally displaced fracture of the posterolateral wall of the right maxillary sinus. There is fluid within that sinus.  Non operative

## 2020-09-12 NOTE — ASSESSMENT & PLAN NOTE
Right tibial plateau fracture.  9/12 Closed treatment in OR  Weight bearing status - Touch toe weightbearing RLE.  Lele Corbett MD. Orthopedic Surgeon. [facility].
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
## 2020-09-12 NOTE — ANESTHESIA PROCEDURE NOTES
Airway    Date/Time: 9/12/2020 3:04 PM  Performed by: Dimas Zimmer M.D.  Authorized by: Dimas Zimmer M.D.     Location:  OR  Urgency:  Elective  Difficult Airway: No    Indications for Airway Management:  Anesthesia      Spontaneous Ventilation: absent    Sedation Level:  Deep  Preoxygenated: Yes    Patient Position:  Sniffing  Final Airway Type:  Endotracheal airway  Final Endotracheal Airway:  ETT  Cuffed: Yes    Technique Used for Successful ETT Placement:  Direct laryngoscopy    Insertion Site:  Oral  Blade Type:  Eleni  Laryngoscope Blade/Videolaryngoscope Blade Size:  3  ETT Size (mm):  8.0  Measured from:  Lips  ETT to Lips (cm):  22  Placement Verified by: auscultation and capnometry    Cormack-Lehane Classification:  Grade IIa - partial view of glottis  Number of Attempts at Approach:  1  Number of Other Approaches Attempted:  0

## 2020-09-13 VITALS
WEIGHT: 137.35 LBS | RESPIRATION RATE: 16 BRPM | DIASTOLIC BLOOD PRESSURE: 68 MMHG | SYSTOLIC BLOOD PRESSURE: 109 MMHG | HEART RATE: 63 BPM | OXYGEN SATURATION: 95 % | HEIGHT: 63 IN | TEMPERATURE: 99.1 F | BODY MASS INDEX: 24.34 KG/M2

## 2020-09-13 PROBLEM — S82.101A: Status: ACTIVE | Noted: 2020-09-12

## 2020-09-13 PROBLEM — Z11.9 SCREENING EXAMINATION FOR INFECTIOUS DISEASE: Status: RESOLVED | Noted: 2020-09-12 | Resolved: 2020-09-13

## 2020-09-13 PROBLEM — J45.909 ASTHMA: Status: ACTIVE | Noted: 2020-09-13

## 2020-09-13 PROBLEM — S72.401A CLOSED FRACTURE OF DISTAL END OF RIGHT FEMUR, INITIAL ENCOUNTER (HCC): Status: ACTIVE | Noted: 2020-09-12

## 2020-09-13 PROBLEM — T14.90XA TRAUMA: Status: ACTIVE | Noted: 2020-09-13

## 2020-09-13 LAB
ABO + RH BLD: NORMAL
ALBUMIN SERPL BCP-MCNC: 3.9 G/DL (ref 3.2–4.9)
ALBUMIN/GLOB SERPL: 1.8 G/DL
ALP SERPL-CCNC: 50 U/L (ref 30–99)
ALT SERPL-CCNC: 63 U/L (ref 2–50)
ANION GAP SERPL CALC-SCNC: 13 MMOL/L (ref 7–16)
AST SERPL-CCNC: 79 U/L (ref 12–45)
BASOPHILS # BLD AUTO: 0.1 % (ref 0–1.8)
BASOPHILS # BLD: 0.01 K/UL (ref 0–0.12)
BILIRUB SERPL-MCNC: 0.6 MG/DL (ref 0.1–1.5)
BUN SERPL-MCNC: 9 MG/DL (ref 8–22)
CALCIUM SERPL-MCNC: 8.9 MG/DL (ref 8.5–10.5)
CHLORIDE SERPL-SCNC: 102 MMOL/L (ref 96–112)
CO2 SERPL-SCNC: 24 MMOL/L (ref 20–33)
CREAT SERPL-MCNC: 0.89 MG/DL (ref 0.5–1.4)
EOSINOPHIL # BLD AUTO: 0 K/UL (ref 0–0.51)
EOSINOPHIL NFR BLD: 0 % (ref 0–6.9)
ERYTHROCYTE [DISTWIDTH] IN BLOOD BY AUTOMATED COUNT: 39.8 FL (ref 35.9–50)
GLOBULIN SER CALC-MCNC: 2.2 G/DL (ref 1.9–3.5)
GLUCOSE SERPL-MCNC: 139 MG/DL (ref 65–99)
HCT VFR BLD AUTO: 38.9 % (ref 42–52)
HGB BLD-MCNC: 13.3 G/DL (ref 14–18)
IMM GRANULOCYTES # BLD AUTO: 0.04 K/UL (ref 0–0.11)
IMM GRANULOCYTES NFR BLD AUTO: 0.4 % (ref 0–0.9)
LYMPHOCYTES # BLD AUTO: 1.05 K/UL (ref 1–4.8)
LYMPHOCYTES NFR BLD: 10.3 % (ref 22–41)
MCH RBC QN AUTO: 31.1 PG (ref 27–33)
MCHC RBC AUTO-ENTMCNC: 34.2 G/DL (ref 33.7–35.3)
MCV RBC AUTO: 90.9 FL (ref 81.4–97.8)
MONOCYTES # BLD AUTO: 1.05 K/UL (ref 0–0.85)
MONOCYTES NFR BLD AUTO: 10.3 % (ref 0–13.4)
NEUTROPHILS # BLD AUTO: 8 K/UL (ref 1.82–7.42)
NEUTROPHILS NFR BLD: 78.9 % (ref 44–72)
NRBC # BLD AUTO: 0 K/UL
NRBC BLD-RTO: 0 /100 WBC
PLATELET # BLD AUTO: 216 K/UL (ref 164–446)
PMV BLD AUTO: 11.3 FL (ref 9–12.9)
POTASSIUM SERPL-SCNC: 4.2 MMOL/L (ref 3.6–5.5)
PROT SERPL-MCNC: 6.1 G/DL (ref 6–8.2)
RBC # BLD AUTO: 4.28 M/UL (ref 4.7–6.1)
SODIUM SERPL-SCNC: 139 MMOL/L (ref 135–145)
WBC # BLD AUTO: 10.2 K/UL (ref 4.8–10.8)

## 2020-09-13 PROCEDURE — 700102 HCHG RX REV CODE 250 W/ 637 OVERRIDE(OP): Performed by: NURSE PRACTITIONER

## 2020-09-13 PROCEDURE — 80053 COMPREHEN METABOLIC PANEL: CPT

## 2020-09-13 PROCEDURE — 97166 OT EVAL MOD COMPLEX 45 MIN: CPT

## 2020-09-13 PROCEDURE — 85025 COMPLETE CBC W/AUTO DIFF WBC: CPT

## 2020-09-13 PROCEDURE — A9270 NON-COVERED ITEM OR SERVICE: HCPCS | Performed by: NURSE PRACTITIONER

## 2020-09-13 PROCEDURE — 97161 PT EVAL LOW COMPLEX 20 MIN: CPT

## 2020-09-13 PROCEDURE — 700111 HCHG RX REV CODE 636 W/ 250 OVERRIDE (IP): Performed by: NURSE PRACTITIONER

## 2020-09-13 PROCEDURE — 36415 COLL VENOUS BLD VENIPUNCTURE: CPT

## 2020-09-13 PROCEDURE — 700101 HCHG RX REV CODE 250: Performed by: NURSE PRACTITIONER

## 2020-09-13 PROCEDURE — 700111 HCHG RX REV CODE 636 W/ 250 OVERRIDE (IP): Performed by: ORTHOPAEDIC SURGERY

## 2020-09-13 RX ORDER — ACETAMINOPHEN 500 MG
1000 TABLET ORAL EVERY 6 HOURS
Qty: 30 TAB | Refills: 0 | COMMUNITY
Start: 2020-09-13 | End: 2022-01-21

## 2020-09-13 RX ORDER — OXYCODONE HYDROCHLORIDE 5 MG/1
5 TABLET ORAL EVERY 4 HOURS PRN
Qty: 30 TAB | Refills: 0 | Status: SHIPPED | OUTPATIENT
Start: 2020-09-13 | End: 2020-09-20

## 2020-09-13 RX ADMIN — OXYCODONE 10 MG: 5 TABLET ORAL at 12:26

## 2020-09-13 RX ADMIN — Medication 1 EACH: at 06:12

## 2020-09-13 RX ADMIN — ENOXAPARIN SODIUM 30 MG: 30 INJECTION SUBCUTANEOUS at 06:12

## 2020-09-13 RX ADMIN — MORPHINE SULFATE 4 MG: 4 INJECTION INTRAVENOUS at 10:51

## 2020-09-13 RX ADMIN — OXYCODONE 5 MG: 5 TABLET ORAL at 08:51

## 2020-09-13 RX ADMIN — CEFAZOLIN SODIUM 2 G: 2 INJECTION, SOLUTION INTRAVENOUS at 06:12

## 2020-09-13 RX ADMIN — ACETAMINOPHEN 1000 MG: 500 TABLET ORAL at 06:11

## 2020-09-13 RX ADMIN — OXYCODONE 10 MG: 5 TABLET ORAL at 15:34

## 2020-09-13 RX ADMIN — ACETAMINOPHEN 1000 MG: 500 TABLET ORAL at 11:21

## 2020-09-13 RX ADMIN — Medication 1 EACH: at 11:22

## 2020-09-13 ASSESSMENT — COGNITIVE AND FUNCTIONAL STATUS - GENERAL
TURNING FROM BACK TO SIDE WHILE IN FLAT BAD: A LOT
MOVING FROM LYING ON BACK TO SITTING ON SIDE OF FLAT BED: A LITTLE
MOVING TO AND FROM BED TO CHAIR: UNABLE
WALKING IN HOSPITAL ROOM: A LITTLE
TOILETING: A LITTLE
HELP NEEDED FOR BATHING: A LOT
DRESSING REGULAR LOWER BODY CLOTHING: A LOT
STANDING UP FROM CHAIR USING ARMS: A LITTLE
SUGGESTED CMS G CODE MODIFIER DAILY ACTIVITY: CK
CLIMB 3 TO 5 STEPS WITH RAILING: A LITTLE
DAILY ACTIVITIY SCORE: 19
SUGGESTED CMS G CODE MODIFIER MOBILITY: CK
MOBILITY SCORE: 15

## 2020-09-13 ASSESSMENT — ENCOUNTER SYMPTOMS: MYALGIAS: 1

## 2020-09-13 ASSESSMENT — PATIENT HEALTH QUESTIONNAIRE - PHQ9
2. FEELING DOWN, DEPRESSED, IRRITABLE, OR HOPELESS: NOT AT ALL
1. LITTLE INTEREST OR PLEASURE IN DOING THINGS: NOT AT ALL
SUM OF ALL RESPONSES TO PHQ9 QUESTIONS 1 AND 2: 0

## 2020-09-13 ASSESSMENT — PAIN DESCRIPTION - PAIN TYPE
TYPE: ACUTE PAIN
TYPE: SURGICAL PAIN
TYPE: ACUTE PAIN
TYPE: ACUTE PAIN

## 2020-09-13 ASSESSMENT — ACTIVITIES OF DAILY LIVING (ADL): TOILETING: INDEPENDENT

## 2020-09-13 ASSESSMENT — GAIT ASSESSMENTS
DISTANCE (FEET): 20
ASSISTIVE DEVICE: FRONT WHEEL WALKER
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
## 2020-09-13 NOTE — ANESTHESIA QCDR
2019 [facility] Clinical Data Registry (for Quality Improvement)     Postoperative nausea/vomiting risk protocol (Adult = 18 yrs and Pediatric 3-17 yrs)- (430 and 463)  General inhalation anesthetic (NOT TIVA) with PONV risk factors: Yes  Provision of anti-emetic therapy with at least 2 different classes of agents: Yes   Patient DID NOT receive anti-emetic therapy and reason is documented in Medical Record:  N/A    Multimodal Pain Management- (477)  Non-emergent surgery AND patient age >= 18: No  Use of Multimodal Pain Management, two or more drugs and/or interventions, NOT including systemic opioids:   Exception: Documented allergy to multiple classes of analgesics:     Smoking Abstinence (404)  Patient is current smoker (cigarette, pipe, e-cig, marijuanna): No  Elective Surgery:   Abstinence instructions provided prior to day of surgery:   Patient abstained from smoking on day of surgery:     Pre-Op Beta-Blocker in Isolated CABG (44)  Isolated CABG AND patient age >= 18: No  Beta-blocker admin within 24 hours of surgical incision:   Exception:of medical reason(s) for not administering beta blocker within 24 hours prior to surgical incision (e.g., not  indicated,other medical reason):     PACU assessment of acute postoperative pain prior to Anesthesia Care End- Applies to Patients Age = 18- (ABG7)  Initial PACU pain score is which of the following: < 7/10  Patient unable to report pain score: N/A    Post-anesthetic transfer of care checklist/protocol to PACU/ICU- (426 and 427)  Upon conclusion of case, patient transferred to which of the following locations: PACU/Non-ICU  Use of transfer checklist/protocol: Yes  Exclusion: Service Performed in Patient Hospital Room (and thus did not require transfer): N/A  Unplanned admission to ICU related to anesthesia service up through end of PACU care- (MD51)  Unplanned admission to ICU (not initially anticipated at anesthesia start time): No

## 2020-09-13 NOTE — DISCHARGE INSTRUCTIONS
Discharge Instructions    Discharged to home by car with relative. Discharged via wheelchair, hospital escort: Yes.  Special equipment needed: Walker    Be sure to schedule a follow-up appointment with your primary care doctor or any specialists as instructed.     Discharge Plan:        I understand that a diet low in cholesterol, fat, and sodium is recommended for good health. Unless I have been given specific instructions below for another diet, I accept this instruction as my diet prescription.   Other diet: regular    Special Instructions: Discharge instructions for the Orthopedic Patient    Follow up with Primary Care Physician within 2 weeks of discharge to home, regarding:  Review of medications and diagnostic testing.  Surveillance for medical complications.  Workup and treatment of osteoporosis, if appropriate.     -Is this a Hip/Knee/Shoulder Joint Replacement patient? No    -Is this patient being discharged with medication to prevent blood clots?  No    · Is patient discharged on Warfarin / Coumadin?   No     - Call or seek medical attention for questions or concerns  - Follow up with Dr. Corbett in 1-2 weeks time  - Toe Touch weightbearing  - Follow up with primary care provider within one weeks time  - Resume regular diet  - May take over the counter acetaminophen (tylenol) or ibuprofen as needed for pain  - Continue daily over the counter stool softener (docusate or senna)while on narcotics  - No operation of machinery or motorized vehicles while under the influence of narcotics  - No alcohol, marijuana or illicit drug use while under the influence of narcotics  - No swimming, hot tubs, baths or wound submersion until cleared by outpatient provider. May shower  - No contact sports, strenuous activities, or heavy lifting until cleared by outpatient provider  - If respiratory decompensation, change in condition or worsening condition, or signs or symptoms of infection occur seek medical  attention      Intramedullary Nailing of Femoral Shaft Fracture, Care After  This sheet gives you information about how to care for yourself after your procedure. Your health care provider may also give you more specific instructions. If you have problems or questions, contact your health care provider.  What can I expect after the procedure?  After the procedure, it is common to have these symptoms in the surgical area:  · Pain.  · Swelling.  · Tenderness.  · Bruising.  · Stiffness.  · Weakness.  Follow these instructions at home:  Medicines  · Take over-the-counter and prescription medicines only as told by your health care provider.  · Ask your health care provider if the medicine prescribed to you:  ? Requires you to avoid driving or using heavy machinery.  ? Can cause constipation. You may need to take actions to prevent or treat constipation, such as:  § Drink enough fluid to keep your urine pale yellow.  § Take over-the-counter or prescription medicines.  § Eat foods that are high in fiber, such as beans, whole grains, and fresh fruits and vegetables.  § Limit foods that are high in fat and processed sugars, such as fried or sweet foods.  Bathing  · Do not take baths, swim, or use a hot tub until your health care provider approves. Ask your health care provider if you may take showers. You may only be allowed to take sponge baths.  · Keep your bandage (dressing) dry if you shower or take a sponge bath.  Incision care    · Follow instructions from your health care provider about how to take care of your incision. Make sure you:  ? Wash your hands with soap and water before and after you change your dressing. If soap and water are not available, use hand .  ? Change your dressing as told by your health care provider.  ? Leave stitches (sutures), skin glue, or adhesive strips in place. These skin closures may need to stay in place for 2 weeks or longer. If adhesive strip edges start to loosen and curl  up, you may trim the loose edges. Do not remove adhesive strips completely unless your health care provider tells you to do that.  · Check your incision area every day for signs of infection. Check for:  ? More redness, swelling, or pain.  ? Fluid or blood.  ? Warmth.  ? Pus or a bad smell.  Managing pain, stiffness, and swelling    · If directed, put ice on the affected area.  ? Put ice in a plastic bag.  ? Place a towel between your skin and the bag.  ? Leave the ice on for 20 minutes, 2-3 times a day.  · Move your toes often to reduce stiffness and swelling.  · Raise (elevate) the injured area above the level of your heart while you are sitting or lying down.  Activity  · Rest as told by your health care provider.  · Use your crutches or walker as told by your health care provider. Your health care provider will let you know how much weight you can put on your leg. Your health care provider will do X-rays to check bone healing. As healing progresses, you may be allowed to put more weight on your leg.  · Avoid sitting for a long time without moving. Get up to take short walks every 1-2 hours. This is important to improve blood flow and breathing. Ask for help if you feel weak or unsteady.  · Keep all your physical therapy appointments.  · Do exercises as told by your health care provider. These exercises will prevent weakness and stiffness in the affected area.  · Return to your normal activities as told by your health care provider. Ask your health care provider what activities are safe for you. It may take several months to heal completely.  · Ask your health care provider when it is safe to drive.  General instructions  · Do not use any products that contain nicotine or tobacco, such as cigarettes, e-cigarettes, and chewing tobacco. These can delay bone healing after surgery. If you need help quitting, ask your health care provider.  · Take steps to prevent falls at home, such as removing throw rugs and  tripping hazards.  · Keep all follow-up visits as told by your health care provider. This is important.  Contact a health care provider if:  · You are not getting relief from your pain medicine.  · You have more redness, swelling, or pain around your incision.  · You have fluid or blood coming from your incision.  · Your incision feels warm to the touch.  · You have pus or a bad smell coming from your incision.  Get help right away if:  · You have a fever and chills.  · You have chest pain or trouble breathing.  · Your incision breaks open.  · You have severe pain that does not get better with medicine.  Summary  · After your surgery, it is normal to have some pain, swelling, and tenderness in the surgical area.  · Take over-the-counter and prescription medicines only as told by your health care provider.  · Follow instructions from your health care provider about how to take care of your incision. Check your incision area every day for signs of infection.  · Return to your normal activities as told by your health care provider. Ask your health care provider what activities are safe for you.  · It may take several months to heal completely. Keep all follow-up visits as told by your health care provider.  This information is not intended to replace advice given to you by your health care provider. Make sure you discuss any questions you have with your health care provider.  Document Released: 10/21/2019 Document Revised: 10/21/2019 Document Reviewed: 10/21/2019  [employer] Patient Education © 2020 [employer]      Concussion, Adult    A concussion is a brain injury from a hard, direct hit (trauma) to your head or body. This direct hit causes the brain to quickly shake back and forth inside the skull. A concussion may also be called a mild traumatic brain injury (TBI). Healing from this injury can take time.  What are the causes?  This condition is caused by:  · A direct hit to your head, such as:  ? Running into a  player during a game.  ? Being hit in a fight.  ? Hitting your head on a hard surface.  · A quick and sudden movement (jolt) of the head or neck, such as in a car crash.  What are the signs or symptoms?  The signs of a concussion can be hard to notice. They may be missed by you, family members, and doctors. You may look fine on the outside but may not act or feel normal.  Physical symptoms  · Headaches.  · Being tired (fatigued).  · Being dizzy.  · Problems with body balance.  · Problems seeing or hearing.  · Being sensitive to light or noise.  · Feeling sick to your stomach (nausea) or throwing up (vomiting).  · Not sleeping or eating as you used to.  · Loss of feeling (numbness) or tingling in the body.  · Seizure.  Mental and emotional symptoms  · Problems remembering things.  · Trouble focusing your mind (concentrating), organizing, or making decisions.  · Being slow to think, act, react, speak, or read.  · Feeling grouchy (irritable).  · Having mood changes.  · Feeling worried or nervous (anxious).  · Feeling sad (depressed).  How is this treated?  This condition may be treated by:  · Stopping sports or activity if you are injured. If you hit your head or have signs of concussion:  ? Do not return to sports or activities the same day.  ? Get checked by a doctor before you return to your activities.  · Resting your body and your mind.  · Being watched carefully, often at home.  · Medicines to help with symptoms such as:  ? Feeling sick to your stomach.  ? Headaches.  ? Problems with sleep.  § Avoid taking strong pain medicines (opioids) for a concussion.  · Avoiding alcohol and drugs.  · Being asked to go to a concussion clinic or a place to help you recover (rehabilitation center).  Recovery from a concussion can take time. Return to activities only:  · When you are fully healed.  · When your doctor says it is safe.  Follow these instructions at home:  Activity  · Limit activities that need a lot of thought or  focus, such as:  ? Homework or work for your job.  ? Watching TV.  ? Using the computer or phone.  ? Playing memory games and puzzles.  · Rest. Rest helps your brain heal. Make sure you:  ? Get plenty of sleep. Most adults should get 7-9 hours of sleep each night.  ? Rest during the day. Take naps or breaks when you feel tired.  · Avoid activity like exercise until your doctor says its safe. Stop any activity that makes symptoms worse.  · Do not do activities that could cause a second concussion, such as riding a bike or playing sports.  · Ask your doctor when you can return to your normal activities, such as school, work, sports, and driving. Your ability to react may be slower. Do not do these activities if you are dizzy.  General instructions    · Take over-the-counter and prescription medicines only as told by your doctor.  · Do not drink alcohol until your doctor says you can.  · Watch your symptoms and tell other people to do the same. Other problems can occur after a concussion. Older adults have a higher risk of serious problems.  · Tell your , teachers, school nurse, school counselor, , or  about your injury and symptoms. Tell them about what you can or cannot do.  · Keep all follow-up visits as told by your doctor. This is important.  How is this prevented?  · It is very important that you do not get another brain injury. In rare cases, another injury can cause brain damage that will not go away, brain swelling, or death. The risk of this is greatest in the first 7-10 days after a head injury. To avoid injuries:  ? Stop activities that could lead to a second concussion, such as contact sports, until your doctor says it is okay.  ? When you return to sports or activities:  § Do not crash into other players. This is how most concussions happen.  § Follow the rules.  § Respect other players.  ? Get regular exercise. Do strength and balance training.  ? Wear a helmet that fits  you well during sports, biking, or other activities.  § Helmets can help protect you from serious skull and brain injuries, but they do not protect you from a concussion. Even when wearing a helmet, you should avoid being hit in the head.  Contact a doctor if:  · Your symptoms get worse or they do not get better.  · You have new symptoms.  · You have another injury.  Get help right away if:  · You have bad headaches or your headaches get worse.  · You feel weak or numb in any part of your body.  · You are mixed up (confused).  · Your balance gets worse.  · You keep throwing up.  · You feel more sleepy than normal.  · Your speech is not clear (is slurred).  · You cannot recognize people or places.  · You have a seizure.  · Others have trouble waking you up.  · You have behavior changes.  · You have changes in how you see (vision).  · You pass out (lose consciousness).  Summary  · A concussion is a brain injury from a hard, direct hit (trauma) to your head or body.  · This condition is treated with rest and careful watching of symptoms.  · If you keep having symptoms, call your doctor.  This information is not intended to replace advice given to you by your health care provider. Make sure you discuss any questions you have with your health care provider.  Document Released: 12/06/2010 Document Revised: 08/08/2019 Document Reviewed: 08/08/2019  [employer] Patient Education © 2020 [employer]        Depression / Suicide Risk    As you are discharged from this [facility] facility, it is important to learn how to keep safe from harming yourself.    Recognize the warning signs:  · Abrupt changes in personality, positive or negative- including increase in energy   · Giving away possessions  · Change in eating patterns- significant weight changes-  positive or negative  · Change in sleeping patterns- unable to sleep or sleeping all the time   · Unwillingness or inability to communicate  · Depression  · Unusual sadness,  discouragement and loneliness  · Talk of wanting to die  · Neglect of personal appearance   · Rebelliousness- reckless behavior  · Withdrawal from people/activities they love  · Confusion- inability to concentrate     If you or a loved one observes any of these behaviors or has concerns about self-harm, here's what you can do:  · Talk about it- your feelings and reasons for harming yourself  · Remove any means that you might use to hurt yourself (examples: pills, rope, extension cords, firearm)  · Get professional help from the community (Mental Health, Substance Abuse, psychological counseling)  · Do not be alone:Call your Safe Contact- someone whom you trust who will be there for you.  · Call your local CRISIS HOTLINE 547-4478 or 723-146-2970  · Call your local Children's Mobile Crisis Response Team [place] (561) 997-0609 or www.lifeaction games  · Call the toll free National Suicide Prevention Hotlines   · National Suicide Prevention Lifeline 002-078-BDGU (4456)  · National Hope Line Network 800-SUICIDE (110-2778)

## 2020-09-13 NOTE — PROGRESS NOTES
"TRAUMA TERTIARY SURVEY     Mental status adequate for full examination?: Yes    Spine cleared (radiologically and/or clinically): Yes    PHYSICAL EXAMINATION:  Vitals: /75   Pulse 85   Temp 37.4 °C (99.3 °F) (Temporal)   Resp 17   Ht 1.6 m (5' 3\")   Wt 62.3 kg (137 lb 5.6 oz)   SpO2 96%   BMI 24.33 kg/m²   Constitutional:     General Appearance: appears stated age.  HEENT:    Abrasions noted to lips and nose.. The pupils are equal, round, and reactive to light bilaterally. The extraocular muscles are intact bilaterally.. The nares and oropharynx are clear. The midface and jaw are stable. No malocclusion is evident.  Neck:    The cervical spine is supple and non tender.  Respiratory:   Inspection: Unlabored respirations, no intercostal retractions, paradoxical motion, or accessory muscle use.   Palpation:  The chest is nontender. The clavicles are non deformed bilaterally..   Auscultation: clear to auscultation.  Cardiovascular:   Auscultation: regular rate and rhythm.   Peripheral Pulses: Normal.   Abdomen:   Abdomen is soft, nontender, without organomegaly or masses.  Genitourinary:   (MALE): not observed.  Musculoskeletal:   The pelvis is stable. RLE with large post op dressing from toes to groin  Back:   The thoracolumbar spine was examined. Examination is remarkable for no significant tenderness, swelling, or deformity in the thoracolumbar region.  Skin:   The skin is warm and dry.  Neurologic:    [place] Coma Scale (GCS) 15 [id number]. Neurologic examination revealed oriented for age x3.  Psychiatric:   The patient does not appear depressed or anxious.    IMAGING:  DX-FEMUR-2+ RIGHT   Final Result      Intraoperative fluoroscopy spot images as described above.      CT-KNEE W/O PLUS RECONS RIGHT   Final Result      1.  Displaced, comminuted and impacted distal femoral diaphyseal fracture.      2.  Left proximal tibial fracture which involves the area of the tibial spines and extends into the medial most " aspect of the lateral tibial plateau where there is minimal articular surface separation and impaction.      CT-CHEST,ABDOMEN,PELVIS WITH   Final Result      1.  No evidence of thoracic, abdominal or pelvic organ injury.      2.  Surgical or congenital absence of the left kidney.      3.  Fatty liver.      CT-LSPINE W/O PLUS RECONS   Final Result      No evidence of fracture of the lumbar spine.      CT-TSPINE W/O PLUS RECONS   Final Result      No evidence of fracture or dislocation of the thoracic spine.      CT-HEAD W/O   Final Result      No evidence of acute intracranial process.      CT-MAXILLOFACIAL W/O PLUS RECONS   Final Result      Likely minimally displaced fracture of the posterolateral wall of the right maxillary sinus. There is fluid within that sinus.      CT-CSPINE WITHOUT PLUS RECONS   Final Result      No evidence of cervical spine fracture.      DX-TIBIA AND FIBULA RIGHT   Final Result      No radiographic evidence of acute traumatic injury.      DX-FEMUR-2+ RIGHT   Final Result      Displaced angulated left distal femoral diaphysis fracture.      DX-PELVIS-1 OR 2 VIEWS   Final Result      No evidence of fracture or dislocation.      DX-CHEST-LIMITED (1 VIEW)   Final Result      No acute cardiopulmonary disease.      DX-PORTABLE FLUORO > 1 HOUR    (Results Pending)     All current laboratory studies/radiology exams reviewed: Yes    Completed Consultations:  Orthopedic    Pending Consultations:  none    Newly Identified Diagnoses and Injuries:  none    TOTAL RAP SCORE:  RAP Score Total: 4      ETOH Screening  CAGE Score: 0  Assessment complete date: 9/13/2020

## 2020-09-13 NOTE — DISCHARGE PLANNING
Received Choice form at [id number]  Agency/Facility Name: [facility]  Referral sent per Choice form @ [phone]

## 2020-09-13 NOTE — PROGRESS NOTES
0845 requested that UC change patient's name from alias.   0979 UC called admissions but no answer. UC will continue to contact.
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
## 2020-09-13 NOTE — THERAPY
Physical Therapy   Initial Evaluation     Patient Name: Nish Sixty-Eight  Age:  25 y.o., Sex:  male  Medical Record #: [id number]  Today's Date: 9/13/2020     Precautions: Fall Risk, Toe Touch Weight Bearing Right Lower Extremity    Assessment  Patient is a 25 y.o. male admitted following [facility]. Pt now s/p IMN for R femur fx, and closed treatment of R tibial plateau fx on 9/12. Pt seen for PT evaluation at this time. Pt very motivated to participate. Pt reports he has not yet been OOB post-op. Pt was able to complete mobility with min A/CGA. Pt trialed ambulating/hopping with FWW and with crutches. Demonstrated improved stability when utilizing FWW, although these crutches were slightly to tall for him. Pt reports incr R knee pain when mobilizing. Pt reports no concerns regarding home and has support from roommates. Pt would benefit from FWW at DC. If pt remains in acute setting, will follow up for continued crutch training though pt reports would use either AD at home. Pt will benefit from outpatient PT once medically cleared.     Plan  Recommend Physical Therapy 5 times per week until therapy goals are met for the following treatments:  Bed Mobility, Gait Training, Neuro Re-Education / Balance, Self Care/Home Evaluation, Stair Training, Therapeutic Activities and Therapeutic Exercises  DC Equipment Recommendations: Front-Wheel Walker, Wheelchair  Discharge Recommendations: Recommend outpatient physical therapy services to address higher level deficits (when medically cleared by surgeon)       09/13/20 1112   Prior Living Situation   Prior Services None   Housing / Facility 1 Story House   Steps Into Home 1   Steps In Home 0   Bathroom Set up Bathtub / Shower Combination   Equipment Owned None   Lives with - Patient's Self Care Capacity Friends   Comments Reports has 2 roommates who work but are able to assist when home   Prior Level of Functional Mobility   Bed Mobility Independent   Transfer Status Independent    Ambulation Independent   Distance Ambulation (Feet) community distances   Assistive Devices Used None   Stairs Independent   Balance Assessment   Comments no LOB when standing with FWW, anterior LOB when using crutches   Gait Analysis   Gait Level Of Assist CGA for safety, first time OOB   Assistive Device Front Wheel Walker   Distance (Feet) 20   Weight Bearing Status RLE TTWB   Comments pt able to maintain TTWB throughout. trialed walker and crutches, improved stability when using FWW.    Bed Mobility    Supine to Sit Minimal Assist   Sit to Supine Minimal Assist   Scooting Supervised   Comments requires assist at RLE to manage in/out of bed   Functional Mobility   Sit to Stand CGA for safety   Short Term Goals    Short Term Goal # 1 pt will perform supine <> sit without bed features with SPV in 6 visits to get in/out of bed independently at home   Short Term Goal # 2 pt will perform all functional xfrs with SPV in 6 visits for improved independence   Short Term Goal # 3 pt will ambulate 50ft with FWW or axillary crutches with SPV in 6 visits for improved independence for home   Short Term Goal # 4 pt will negotiate 1 step with SPV in 6 visits to access home

## 2020-09-13 NOTE — PROGRESS NOTES
Pt being dc'd to home with FWW. Unclear if wheelchair or shower chair can be arranged today. Pt states he has a friend with a wheelchair and he will purchase shower chair OOP. The following prescriptions given oxycodone. IV dc'd. Dc instructions discussed. All questions answered.  Pt will be dc'd at 1545 when pt's ride arrives.    Pain medication given to patinet prior to dc. DC paperwork discussed with patient and with mother at bedside. Pt dc'd with all belongings including ipad, phone and phone , FWW. Pt trasnported to car in w/c.

## 2020-09-13 NOTE — DISCHARGE PLANNING
Anticipated Discharge Disposition: Home with a walker and tub transfer bench.    Action: Spoke with the patient at the bedside about DME company choices. The patient picked [employer].  Choice form faxed to Haley PARRY at [phone].    Barriers to Discharge: DME    Plan: Will continue to follow for any discharge needs/barriers.

## 2020-09-13 NOTE — ANESTHESIA POSTPROCEDURE EVALUATION
Patient: Nish Sixty-Eight    Procedure Summary     Date: 09/12/20 Room / Location: [place] [address] / SURGERY [facility]    Anesthesia Start: 1458 Anesthesia Stop: 1650    Procedure: INSERTION, INTRAMEDULLARY CRISTIAN, FEMUR (Leg Upper) Diagnosis: (Right femur fracture)    Surgeon: Lele Corbett M.D. Responsible Provider: Dimas Zimmer M.D.    Anesthesia Type: general ASA Status: 2 - Emergent          Final Anesthesia Type: general  Last vitals  BP   Blood Pressure: 125/78, NIBP: 111/92    Temp   36.3 °C (97.4 °F)    Pulse   Pulse: 70   Resp   16    SpO2   92 %      Anesthesia Post Evaluation    Patient location during evaluation: PACU  Patient participation: complete - patient participated  Level of consciousness: awake and alert  Pain score: 4    Airway patency: patent  Anesthetic complications: no  Cardiovascular status: hemodynamically stable  Respiratory status: acceptable  Hydration status: euvolemic    PONV: none           Nurse Pain Score: 1 (NPRS)
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
## 2020-09-13 NOTE — PROGRESS NOTES
Pt arrived to unit to room T306. Pt AAOx4, VSS. Assessment complete. Pt rating pain 3/10, tolerable per pt. Surgical dressing to RLE CD&I. Bed locked and in lowest position. Pt educated on use of call light.
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
## 2020-09-13 NOTE — CARE PLAN
Problem: Safety  Goal: Will remain free from injury  Outcome: PROGRESSING AS EXPECTED  Intervention: Provide assistance with mobility  Flowsheets (Taken 9/13/2020 0057)  Assistance: Assistance of One  Note: Post surgery, weakness on right leg, encourage to call for any assistance needed, call light within reach.     Problem: Pain Management  Goal: Pain level will decrease to patient's comfort goal  Outcome: PROGRESSING AS EXPECTED  Intervention: Educate and implement non-pharmacologic comfort measures. Examples: relaxation, distration, play therapy, activity therapy, massage, etc.  Flowsheets (Taken 9/13/2020 0057)  Intervention:   Relaxation Technique   Repositioned   Rest   Cold Pack   Medication (see MAR)  Note: Pain assessment q 2 hours, medicated as needed, comfort measures provided.
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
## 2020-09-13 NOTE — PROGRESS NOTES
FFW sized to pt and delivered to bedside for future use per protocol. Any further assistance please contact traction.

## 2020-09-13 NOTE — ASSESSMENT & PLAN NOTE
Systemic anticoagulation contraindicated secondary to elevated bleeding risk.  9/13 Lovenox initiated

## 2020-09-13 NOTE — PROGRESS NOTES
"   Orthopaedic Progress Note    Interval changes:  Patient doing well  Cleared for DC home by ortho pending trauma medicine clearance     Follow up in two weeks - keep dressing clean and dry    ROS - Patient denies any new issues.  Pain well controlled.    /68   Pulse 63   Temp 37.3 °C (99.1 °F) (Temporal)   Resp 16   Ht 1.6 m (5' 3\")   Wt 62.3 kg (137 lb 5.6 oz)   SpO2 95%       Patient seen and examined  No acute distress  Breathing non labored  RRR  RLE Surgical dressing is clean, dry, and intact. Patient clearly fires tibialis anterior, EHL, and gastrocnemius/soleus. Sensation is intact to light touch throughout superficial peroneal, deep peroneal, tibial, saphenous, and sural nerve distributions. Strong and palpable 2+ dorsalis pedis and posterior tibial pulses with capillary refill less than 2 seconds. No lower leg tenderness or discomfort.       Recent Labs     09/12/20  1109 09/13/20  0758   WBC 10.3 10.2   RBC 5.41 4.28*   HEMOGLOBIN 16.9 13.3*   HEMATOCRIT 48.4 38.9*   MCV 89.5 90.9   MCH 31.2 31.1   MCHC 34.9 34.2   RDW 39.7 39.8   PLATELETCT 300 216   MPV 11.3 11.3       Active Hospital Problems    Diagnosis   • Closed fracture of distal end of right femur, initial encounter (Formerly [facility]) [S72.401A]     Priority: High   • Closed fracture of proximal end of right tibia, initial encounter [S82.101A]     Priority: High   • Asthma [J45.909]     Priority: Medium   • Concussion with loss of consciousness [S06.0X9A]     Priority: Medium   • Trauma [T14.90XA]     Priority: Low   • Closed fracture of right side of maxilla ([facility]) [S02.40CA]     Priority: Low   • Absence of kidney [Z90.5]     Priority: Low   • Contraindication to deep vein thrombosis (DVT) prophylaxis [Z53.09]     Priority: Low       Assessment/Plan:  Cleared for DC home by ortho pending trauma medicine clearance   POD#1 S/P:  1.  Open treatment with intramedullary nailing of right femur shaft fracture.  2.  Closed treatment of right tibial " plateau fracture  Wt bearing status - TTWB RLE  Wound care/Drains - dressing left in place  Future Procedures - none planned   Lovenox: Start 9/13, Duration-until ambulatory > 150'  Sutures/Staples out- 10-14 days post operatively  PT/OT-initiated  Antibiotics: completed  DVT Prophylaxis- TEDS/SCDs/Foot pumps  Miranda-none   Case Coordination for Discharge Planning - Disposition home
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
## 2020-09-13 NOTE — ANESTHESIA TIME REPORT
Anesthesia Start and Stop Event Times     Date Time Event    9/12/2020 1453 Ready for Procedure     1458 Anesthesia Start     1650 Anesthesia Stop        Responsible Staff  09/12/20    Name Role Begin End    Dimas Zimmer M.D. Anesth 1458 1650        Preop Diagnosis (Free Text):  Pre-op Diagnosis     Right femur fracture        Preop Diagnosis (Codes):    Post op Diagnosis  Femur fracture, right (HCC)      Premium Reason  E. Weekend    Comments:

## 2022-01-21 ENCOUNTER — OFFICE VISIT (OUTPATIENT)
Dept: MEDICAL GROUP | Facility: CLINIC | Age: 27
End: 2022-01-21
Payer: COMMERCIAL

## 2022-01-21 VITALS
OXYGEN SATURATION: 95 % | DIASTOLIC BLOOD PRESSURE: 62 MMHG | WEIGHT: 148 LBS | BODY MASS INDEX: 23.23 KG/M2 | HEART RATE: 76 BPM | TEMPERATURE: 97.6 F | HEIGHT: 67 IN | SYSTOLIC BLOOD PRESSURE: 110 MMHG

## 2022-01-21 DIAGNOSIS — Z90.5 ABSENCE OF KIDNEY: ICD-10-CM

## 2022-01-21 DIAGNOSIS — F41.8 MIXED ANXIETY DEPRESSIVE DISORDER: ICD-10-CM

## 2022-01-21 DIAGNOSIS — J45.20 MILD INTERMITTENT ASTHMA WITHOUT COMPLICATION: ICD-10-CM

## 2022-01-21 PROCEDURE — 99204 OFFICE O/P NEW MOD 45 MIN: CPT | Mod: GC | Performed by: STUDENT IN AN ORGANIZED HEALTH CARE EDUCATION/TRAINING PROGRAM

## 2022-01-21 ASSESSMENT — PATIENT HEALTH QUESTIONNAIRE - PHQ9
5. POOR APPETITE OR OVEREATING: 0 - NOT AT ALL
CLINICAL INTERPRETATION OF PHQ2 SCORE: 2
SUM OF ALL RESPONSES TO PHQ QUESTIONS 1-9: 10

## 2022-01-21 ASSESSMENT — FIBROSIS 4 INDEX: FIB4 SCORE: 1.15

## 2022-01-22 NOTE — ASSESSMENT & PLAN NOTE
Educated on symptoms of kidney failure to watch out for; however, this will be long in the future if he does ever end up having any issues.  Discussed medications to avoid, avoiding smoking, substantial alcohol use, or other illicit substance use.  Will note in history for the future if he has any problems

## 2022-01-22 NOTE — ASSESSMENT & PLAN NOTE
Symptoms are not chronic or persistent enough to necessarily diagnosed with major depressive or generalized anxiety disorder.  However, he does have symptoms of both mild anxiety and mild depression, does have some components of PTSD it seems, also has occasional panic attacks.  Discussed possibility of psychotherapy which he is interested in but has difficulty getting time off of work.  Offered video visits, at this time he is interested in starting with medicine, which I think is reasonable, and considering evaluation and psychotherapy in the future.    Education and reassurance provided  Will trial Zoloft starting dose 50 mg  Reviewed side effects to watch out for, will notify myself if he is having any severe side effects including increase in suicidality, thoughts of harming himself, sexual side effects, or anything else that he finds he cannot tolerate.  Follow-up in 4 to 6 weeks to determine efficacy and adjust dose as needed
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
## 2022-03-18 ENCOUNTER — OFFICE VISIT (OUTPATIENT)
Dept: MEDICAL GROUP | Facility: CLINIC | Age: 27
End: 2022-03-18
Payer: COMMERCIAL

## 2022-03-18 VITALS
TEMPERATURE: 97 F | RESPIRATION RATE: 16 BRPM | HEART RATE: 60 BPM | SYSTOLIC BLOOD PRESSURE: 131 MMHG | HEIGHT: 67 IN | DIASTOLIC BLOOD PRESSURE: 85 MMHG | WEIGHT: 140 LBS | OXYGEN SATURATION: 95 % | BODY MASS INDEX: 21.97 KG/M2

## 2022-03-18 DIAGNOSIS — F41.8 MIXED ANXIETY DEPRESSIVE DISORDER: ICD-10-CM

## 2022-03-18 PROCEDURE — 99213 OFFICE O/P EST LOW 20 MIN: CPT | Mod: GE | Performed by: STUDENT IN AN ORGANIZED HEALTH CARE EDUCATION/TRAINING PROGRAM

## 2022-03-18 ASSESSMENT — FIBROSIS 4 INDEX: FIB4 SCORE: 1.15

## 2022-03-18 NOTE — ASSESSMENT & PLAN NOTE
Stable on current dose, patient is comfortable continuing at this time  Establish with psychology  Follow-up in 6 months or sooner if needed

## 2022-03-18 NOTE — PROGRESS NOTES
"HPI  Jorge Patton is a 26 y.o. male presenting for followup depression.    Problem   Mixed Anxiety Depressive Disorder    Significant improvement in anxiety and depressive symptoms since last visit.  Reports that initially he did feel little bit foggy and had to cut the dose in half for the first 3 weeks of treatment, but increased to full 50 mg dose 3 weeks ago and has tolerated well since.  Did have 1-2 episodes of emotional disconnection, but feels comfortable on this dose despite these.  Overall has had dramatic improvement in mood, sleep, energy, is feeling more outgoing.  Grateful and enjoys where he is at now.    Did have one episode where he traveled and forgot medication and after that spiraled with significant worsening of symptoms over the next 1 to 2 days, once he got stable on his medicine again improved significantly and has been stable ever since.    Interested in establishing with therapy, does have psychologists he knows through friends and wants to start there.              [facility]   Past Medical History:   Diagnosis Date   • Exercise-induced asthma        Past Surgical History:   Procedure Laterality Date   • FEMUR NAILING INTRAMEDULLARY  9/12/2020    Procedure: INSERTION, INTRAMEDULLARY CRISTIAN, FEMUR;  Surgeon: Lele Corbett M.D.;  Location: SURGERY [facility];  Service: Orthopedics       Social History     Tobacco Use   • Smoking status: Never Smoker   • Smokeless tobacco: Former User     Types: Chew   • Tobacco comment: ocassionaly   Substance Use Topics   • Alcohol use: Yes     Alcohol/week: 1.8 oz     Types: 1 Shots of liquor, 2 Cans of beer per week     Comment: occ       No family history on file.      PE  /85   Pulse 60   Temp 36.1 °C (97 °F)   Resp 16   Ht 1.702 m (5' 7\")   Wt 63.5 kg (140 lb)   SpO2 95%   BMI 21.93 kg/m²     Physical Exam  Constitutional:       General: He is not in acute distress.     Appearance: Normal appearance. He is not ill-appearing.   HENT:      " Head: Normocephalic and atraumatic.      Nose: Nose normal.      Mouth/Throat:      Mouth: Mucous membranes are moist.      Pharynx: Oropharynx is clear.   Eyes:      Extraocular Movements: Extraocular movements intact.      Conjunctiva/sclera: Conjunctivae normal.   Pulmonary:      Effort: Pulmonary effort is normal. No respiratory distress.   Musculoskeletal:         General: No swelling, deformity or signs of injury. Normal range of motion.      Cervical back: Normal range of motion and neck supple.   Lymphadenopathy:      Cervical: No cervical adenopathy.   Skin:     General: Skin is warm and dry.      Findings: No rash.   Neurological:      General: No focal deficit present.      Mental Status: He is alert and oriented to person, place, and time.   Psychiatric:         Mood and Affect: Mood normal.         Behavior: Behavior normal.         Thought Content: Thought content normal.         Judgment: Judgment normal.          A/P:  Mixed anxiety depressive disorder  Stable on current dose, patient is comfortable continuing at this time  Establish with psychology  Follow-up in 6 months or sooner if needed

## (undated) DEVICE — DRAPE SURGICAL U 77X120 - (10/CA)

## (undated) DEVICE — PADDING CAST 6 IN STERILE - 6 X 4 YDS (24/CA)

## (undated) DEVICE — WRAP COBAN SELF-ADHERENT 6 IN X  5YDS STERILE TAN (12/CA)

## (undated) DEVICE — NEPTUNE 4 PORT MANIFOLD - (20/PK)

## (undated) DEVICE — DRAPESURG STERI-DRAPE LONG - (10/BX 4BX/CA)

## (undated) DEVICE — HEAD HOLDER JUNIOR/ADULT

## (undated) DEVICE — PACK MAJOR ORTHO - (2EA/CA)

## (undated) DEVICE — ELECTRODE DUAL RETURN W/ CORD - (50/PK)

## (undated) DEVICE — SUCTION INSTRUMENT YANKAUER OPEN TIP W/O VENT (50EA/CA)

## (undated) DEVICE — GLOVE BIOGEL INDICATOR SZ 7.5 SURGICAL PF LTX - (50PR/BX 4BX/CA)

## (undated) DEVICE — TOWELS CLOTH SURGICAL - (4/PK 20PK/CA)

## (undated) DEVICE — ELECTRODE 850 FOAM ADHESIVE - HYDROGEL RADIOTRNSPRNT (50/PK)

## (undated) DEVICE — DRESSING LEUKOMED STERILE 11.75X4IN - (50/CA)

## (undated) DEVICE — PROTECTOR ULNA NERVE - (36PR/CA)

## (undated) DEVICE — TUBING CLEARLINK DUO-VENT - C-FLO (48EA/CA)

## (undated) DEVICE — SENSOR SPO2 NEO LNCS ADHESIVE (20/BX) SEE USER NOTES

## (undated) DEVICE — DRAPE SURG STERI-DRAPE 7X11OD - (40EA/CA)

## (undated) DEVICE — DRAPE LARGE 3 QUARTER - (20/CA)

## (undated) DEVICE — DRAPE 36X28IN RAD CARM BND BG - (25/CA) O

## (undated) DEVICE — TUBE CONNECT SUCTION CLEAR 120 X 1/4" (50EA/CA)"

## (undated) DEVICE — STAPLER SKIN DISP - (6/BX 10BX/CA) VISISTAT

## (undated) DEVICE — MASK ANESTHESIA ADULT  - (100/CA)

## (undated) DEVICE — KIT ANESTHESIA W/CIRCUIT & 3/LT BAG W/FILTER (20EA/CA)

## (undated) DEVICE — CANISTER SUCTION 3000ML MECHANICAL FILTER AUTO SHUTOFF MEDI-VAC NONSTERILE LF DISP  (40EA/CA)

## (undated) DEVICE — BIT DRILL THREE FLUTED QC NEEDLE POINT 4.2MM 145MM (1TX2=2)

## (undated) DEVICE — SUTURE GENERAL

## (undated) DEVICE — BIT DRILL 4.2MM THREE FLUTED QC 330MM CALIBRATION 100MM (1TX2=2)

## (undated) DEVICE — SET LEADWIRE 5 LEAD BEDSIDE DISPOSABLE ECG (1SET OF 5/EA)

## (undated) DEVICE — GOWN WARMING STANDARD FLEX - (30/CA)

## (undated) DEVICE — BOVIE BLADE COATED - (50/PK)

## (undated) DEVICE — GLOVE BIOGEL PI ORTHO SZ 7 PF LF (40PR/BX)

## (undated) DEVICE — DRAPE U ORTHOPEDIC - (10/BX)

## (undated) DEVICE — SUTURE 2-0 VICRYL PLUS CT-1 36 (36PK/BX)"

## (undated) DEVICE — SUCTION INSTRUMENT YANKAUER BULBOUS TIP W/O VENT (50EA/CA)

## (undated) DEVICE — DRAPE C ARMOR (12EA/CA)

## (undated) DEVICE — BANDAGE ELASTIC STERILE MATRIX 6 X 10 (20EA/CA)

## (undated) DEVICE — DRAPE STRLE REG TOWEL 18X24 - (10/BX 4BX/CA)"

## (undated) DEVICE — SODIUM CHL IRRIGATION 0.9% 1000ML (12EA/CA)

## (undated) DEVICE — SUTURE 0 VICRYL PLUS CT-1 - 36 INCH (36/BX)

## (undated) DEVICE — SLEEVE, VASO, THIGH, MED

## (undated) DEVICE — ROD REAMING STERILE WITH BALL TIP 2.5MM 950MM

## (undated) DEVICE — BLADE SURGICAL CLIPPER - (50EA/CA)

## (undated) DEVICE — Device

## (undated) DEVICE — LACTATED RINGERS INJ 1000 ML - (14EA/CA 60CA/PF)

## (undated) DEVICE — PENCIL ELECTSURG 10FT BTN SWH - (50/CA)

## (undated) DEVICE — SET EXTENSION WITH 2 PORTS (48EA/CA) ***PART #2C8610 IS A SUBSTITUTE*****